# Patient Record
Sex: MALE | URBAN - METROPOLITAN AREA
[De-identification: names, ages, dates, MRNs, and addresses within clinical notes are randomized per-mention and may not be internally consistent; named-entity substitution may affect disease eponyms.]

---

## 2018-02-08 ENCOUNTER — HISTORICAL (OUTPATIENT)
Dept: ADMINISTRATIVE | Facility: HOSPITAL | Age: 43
End: 2018-02-08

## 2022-04-11 ENCOUNTER — HISTORICAL (OUTPATIENT)
Dept: ADMINISTRATIVE | Facility: HOSPITAL | Age: 47
End: 2022-04-11

## 2022-04-27 VITALS
SYSTOLIC BLOOD PRESSURE: 142 MMHG | BODY MASS INDEX: 33.74 KG/M2 | DIASTOLIC BLOOD PRESSURE: 80 MMHG | WEIGHT: 214.94 LBS | HEIGHT: 67 IN

## 2022-05-05 NOTE — HISTORICAL [facility] CERNER
This is a historical note converted from [employer]. Formatting and pictures may have been removed.  Please reference [employer] for original formatting and attached multimedia. Chief Complaint  LEFT KNEE PAIN. NO INJURY.  History of Present Illness  42-year-old male presents today for evaluation of left knee pain. ?Patient denies actual injury to his left knee. ?States that he woke up with?some persistent pain and swelling to the medial aspect of his left knee. ?Has noted some popping and catching since that time. ?Does feel improved today?as the swelling has decreased. ?Denies any radiations. ?Is taking an anti-inflammatory for this although nothing consistently.? Has never had any trouble before.  Review of Systems  Denies fevers, chills, chest pain, shortness of breath. Comprehensive review of systems performed and otherwise negative except as noted in HPI.  Physical Exam  Vitals & Measurements  BP:?142/80?  HT:?170?cm? HT:?170?cm? WT:?97.5?kg? WT:?97.5?kg? BMI:?33.74?  General: No acute distress, alert and oriented, healthy appearing?  HEENT: Head is atraumatic, mucous membranes are moist  Neck: Supples, no JVD  Cardiovascular: Palpable dorsalis pedis and posterior tibial pulses, regular rate and rhythm to those pulses  Lungs: Breathing non-labored  Skin: no rashes appreciated  Neurologic: Can flex and extend knees, ankles, and toes. Sensation is grossly intact  ?  Focused Orthopedic Exam:?  Left knee without wound or skin breakdown.  Moderate, 2+ joint effusion  tenderness to palpation about the medial joint  ROM from?5 extension to 110 flexion  stable to varus/valgus. stable to anterior/posterior drawer  Positive McMurrays  Mild patellofemoral crepitus to ROM  ?  Assessment/Plan  1.?Tear of medial meniscus of left knee  ?Patient signs and symptoms of medial meniscus tear. ?We discussed all treatment options today. ?We will start him on some anti-inflammatories to try to improve his symptoms. ?Should this fail relieve  his symptoms, would plan to get an MRI of his left knee for evaluation of intra-articular injury. ?We will plan to see him back in a month?to monitor his progress?or sooner should he have worsening issues.  Ordered:  meloxicam, 15 mg = 1 tab(s), Oral, Daily, # 30 tab(s), 0 Refill(s)  Clinic Follow up, *Est. 03/13/18 16:30:00 CDT, Order for future visit, Tear of medial meniscus of left knee, [facility]  Office/Outpatient Visit Level 4 Established [id number] PC, Tear of medial meniscus of left knee, [facility], 02/08/18 15:48:00 CST  ?  Left knee pain  ?   Problem List/Past Medical History  Ongoing  Depression  Obesity  Historical  Procedure/Surgical History  RIGHT TIB/FIB FRACTURE  Medications  BUPROPION HCL  MG TABLET, 300 mg= 1 tab(s), Oral, Daily  Mobic 15 mg oral tablet, 15 mg= 1 tab(s), Oral, Daily  SUBOXONE 8 MG-2 MG SL FILM  Allergies  No Known Medication Allergies  Social History  Alcohol - 02/08/2018  Never  Employment/School - 02/08/2018  Employed  Home/Environment - 02/08/2018  Lives with Children, Spouse.  Tobacco - 02/08/2018  Never smoker  Family History  Heart disease 09-AUG-2016 15:25:48<$>: Mother.  Diagnostic Results  AP lateral left knee reviewed. ?Patient has well-maintained joint space. ?No significant arthritic changes noted?does have small osteophyte formation on his patella.